# Patient Record
Sex: FEMALE | Race: WHITE | NOT HISPANIC OR LATINO | Employment: STUDENT | RURAL
[De-identification: names, ages, dates, MRNs, and addresses within clinical notes are randomized per-mention and may not be internally consistent; named-entity substitution may affect disease eponyms.]

---
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
## 2025-01-10 ENCOUNTER — OFFICE VISIT (OUTPATIENT)
Dept: FAMILY MEDICINE | Facility: CLINIC | Age: 20
End: 2025-01-10
Payer: COMMERCIAL

## 2025-01-10 VITALS
WEIGHT: 122 LBS | TEMPERATURE: 99 F | RESPIRATION RATE: 20 BRPM | HEART RATE: 78 BPM | HEIGHT: 64 IN | OXYGEN SATURATION: 100 % | SYSTOLIC BLOOD PRESSURE: 110 MMHG | BODY MASS INDEX: 20.83 KG/M2 | DIASTOLIC BLOOD PRESSURE: 70 MMHG

## 2025-01-10 DIAGNOSIS — R09.89 CHEST CONGESTION: ICD-10-CM

## 2025-01-10 DIAGNOSIS — J01.00 ACUTE NON-RECURRENT MAXILLARY SINUSITIS: Primary | ICD-10-CM

## 2025-01-10 DIAGNOSIS — R05.1 ACUTE COUGH: ICD-10-CM

## 2025-01-10 RX ORDER — GUAIFENESIN, PSEUDOEPHEDRINE HYDROCHLORIDE 600; 60 MG/1; MG/1
1 TABLET, EXTENDED RELEASE ORAL 2 TIMES DAILY
Qty: 20 TABLET | Refills: 0 | Status: SHIPPED | OUTPATIENT
Start: 2025-01-10 | End: 2025-01-20

## 2025-01-10 RX ORDER — DOXYCYCLINE HYCLATE 100 MG
100 TABLET ORAL 2 TIMES DAILY
Qty: 20 TABLET | Refills: 0 | Status: SHIPPED | OUTPATIENT
Start: 2025-01-10 | End: 2025-01-20

## 2025-01-10 NOTE — PROGRESS NOTES
Subjective:       Patient ID: Jhoana Waller is a 19 y.o. female.    Chief Complaint: Nasal Congestion and Cough (COUGH POST NOV. 2024.  CHEST KYAW POST 2 OR 3 WEEKS.  )    Cough, sinus congestion and pressure for 2-3 weeks    Review of Systems   Constitutional:  Negative for appetite change, fatigue and fever.   HENT:  Positive for nasal congestion and sinus pressure/congestion. Negative for ear pain and sore throat.    Eyes:  Negative for pain, discharge and itching.   Respiratory:  Positive for cough. Negative for shortness of breath.    Cardiovascular:  Negative for chest pain and leg swelling.   Gastrointestinal:  Negative for abdominal pain, change in bowel habit, nausea and vomiting.   Musculoskeletal:  Negative for back pain, gait problem and neck pain.   Integumentary:  Negative for rash and wound.   Allergic/Immunologic: Negative for immunocompromised state.   Neurological:  Negative for dizziness, weakness and headaches.   All other systems reviewed and are negative.        Objective:      Physical Exam  Vitals and nursing note reviewed.   Constitutional:       General: She is not in acute distress.     Appearance: Normal appearance. She is not ill-appearing, toxic-appearing or diaphoretic.   HENT:      Head: Normocephalic.      Right Ear: Tympanic membrane, ear canal and external ear normal.      Left Ear: Tympanic membrane, ear canal and external ear normal.      Nose: Mucosal edema and congestion present. No rhinorrhea.      Right Turbinates: Swollen.      Left Turbinates: Swollen.      Right Sinus: Maxillary sinus tenderness present.      Left Sinus: Maxillary sinus tenderness present.      Mouth/Throat:      Mouth: Mucous membranes are moist.      Pharynx: No oropharyngeal exudate or posterior oropharyngeal erythema.   Eyes:      General: No scleral icterus.        Right eye: No discharge.         Left eye: No discharge.      Extraocular Movements: Extraocular movements intact.       Conjunctiva/sclera: Conjunctivae normal.      Pupils: Pupils are equal, round, and reactive to light.   Cardiovascular:      Rate and Rhythm: Normal rate and regular rhythm.      Pulses: Normal pulses.      Heart sounds: Normal heart sounds. No murmur heard.  Pulmonary:      Effort: Pulmonary effort is normal. No respiratory distress.      Breath sounds: Normal breath sounds. No wheezing, rhonchi or rales.      Comments: Productive cough  Musculoskeletal:         General: Normal range of motion.      Cervical back: Neck supple. No tenderness.   Lymphadenopathy:      Cervical: No cervical adenopathy.   Skin:     General: Skin is warm and dry.      Capillary Refill: Capillary refill takes less than 2 seconds.      Findings: No rash.   Neurological:      Mental Status: She is alert and oriented to person, place, and time.   Psychiatric:         Mood and Affect: Mood normal.         Behavior: Behavior normal.         Thought Content: Thought content normal.         Judgment: Judgment normal.            Assessment:       1. Acute non-recurrent maxillary sinusitis    2. Acute cough    3. Chest congestion        Plan:   Acute non-recurrent maxillary sinusitis  -     pseudoephedrine-guaiFENesin  mg (MUCINEX D)  mg per tablet; Take 1 tablet by mouth 2 (two) times daily. for 10 days  Dispense: 20 tablet; Refill: 0  -     doxycycline (VIBRA-TABS) 100 MG tablet; Take 1 tablet (100 mg total) by mouth 2 (two) times daily. for 10 days  Dispense: 20 tablet; Refill: 0    Acute cough  -     pseudoephedrine-guaiFENesin  mg (MUCINEX D)  mg per tablet; Take 1 tablet by mouth 2 (two) times daily. for 10 days  Dispense: 20 tablet; Refill: 0    Chest congestion  -     pseudoephedrine-guaiFENesin  mg (MUCINEX D)  mg per tablet; Take 1 tablet by mouth 2 (two) times daily. for 10 days  Dispense: 20 tablet; Refill: 0           Risks, benefits, and side effects were discussed with the patient. All questions  were answered to the fullest satisfaction of the patient, and pt verbalized understanding and agreement to treatment plan. Pt was to call with any new or worsening symptoms, or present to the ER

## 2025-01-15 ENCOUNTER — OFFICE VISIT (OUTPATIENT)
Facility: CLINIC | Age: 20
End: 2025-01-15
Payer: COMMERCIAL

## 2025-01-15 VITALS
SYSTOLIC BLOOD PRESSURE: 137 MMHG | HEIGHT: 64 IN | OXYGEN SATURATION: 99 % | RESPIRATION RATE: 17 BRPM | WEIGHT: 120.81 LBS | DIASTOLIC BLOOD PRESSURE: 84 MMHG | BODY MASS INDEX: 20.63 KG/M2 | HEART RATE: 89 BPM

## 2025-01-15 DIAGNOSIS — Z72.51 HIGH RISK HETEROSEXUAL BEHAVIOR: ICD-10-CM

## 2025-01-15 DIAGNOSIS — N92.6 IRREGULAR MENSTRUAL CYCLE: Primary | ICD-10-CM

## 2025-01-15 DIAGNOSIS — Z11.3 ENCOUNTER FOR SCREENING FOR INFECTIONS WITH A PREDOMINANTLY SEXUAL MODE OF TRANSMISSION: ICD-10-CM

## 2025-01-15 DIAGNOSIS — G44.029 CHRONIC CLUSTER HEADACHE, NOT INTRACTABLE: ICD-10-CM

## 2025-01-15 LAB
B-HCG UR QL: NEGATIVE
CHLAMYDIA BY PCR: NEGATIVE
CTP QC/QA: YES
N. GONORRHOEAE (GC) BY PCR: NEGATIVE

## 2025-01-15 PROCEDURE — 3075F SYST BP GE 130 - 139MM HG: CPT | Mod: ,,, | Performed by: ADVANCED PRACTICE MIDWIFE

## 2025-01-15 PROCEDURE — 3079F DIAST BP 80-89 MM HG: CPT | Mod: ,,, | Performed by: ADVANCED PRACTICE MIDWIFE

## 2025-01-15 PROCEDURE — 87591 N.GONORRHOEAE DNA AMP PROB: CPT | Mod: ,,, | Performed by: CLINICAL MEDICAL LABORATORY

## 2025-01-15 PROCEDURE — 3008F BODY MASS INDEX DOCD: CPT | Mod: ,,, | Performed by: ADVANCED PRACTICE MIDWIFE

## 2025-01-15 PROCEDURE — 81025 URINE PREGNANCY TEST: CPT | Mod: QW,,, | Performed by: ADVANCED PRACTICE MIDWIFE

## 2025-01-15 PROCEDURE — 87491 CHLMYD TRACH DNA AMP PROBE: CPT | Mod: ,,, | Performed by: CLINICAL MEDICAL LABORATORY

## 2025-01-15 PROCEDURE — 1159F MED LIST DOCD IN RCRD: CPT | Mod: ,,, | Performed by: ADVANCED PRACTICE MIDWIFE

## 2025-01-15 PROCEDURE — 99214 OFFICE O/P EST MOD 30 MIN: CPT | Mod: ,,, | Performed by: ADVANCED PRACTICE MIDWIFE

## 2025-01-15 RX ORDER — NORETHINDRONE 0.35 MG/1
1 TABLET ORAL DAILY
Status: DISCONTINUED | OUTPATIENT
Start: 2025-01-15 | End: 2025-01-15

## 2025-01-15 RX ORDER — NORETHINDRONE 0.35 MG/1
1 TABLET ORAL DAILY
Qty: 30 TABLET | Refills: 11 | Status: SHIPPED | OUTPATIENT
Start: 2025-01-15 | End: 2026-01-15

## 2025-01-15 NOTE — PROGRESS NOTES
"Patient ID:  Jhoana Waller is a 19 y.o. female      Chief Complaint:   Chief Complaint   Patient presents with    Irregular cycles     Had cycle  -, then had another cycle - , and then -. States cycle are really heavy as well        HPI:  Jhoana had bleeding 3 different times in the past month.  She is not on any birth control but uses condoms.  She has tried patches and Dr. Bello gave her pills in the past but she didn't like them.  She normally has only one period a month.  I offered to start her on pills again if she will try it.  She has a headache several times a week and takes Excedrin for relief.  She denies any aura but does get a pain in neck near her spine.  She also has asthma.  LMP: Patient's last menstrual period was 2025 (exact date).   Sexually active:  yes  Contraceptive: condoms      Past Medical History:   Diagnosis Date    Mild intermittent asthma, uncomplicated      No past surgical history on file.    OB History          0    Para   0    Term   0       0    AB   0    Living   0         SAB   0    IAB   0    Ectopic   0    Multiple   0    Live Births   0                 /84 (BP Location: Left arm, Patient Position: Sitting)   Pulse 89   Resp 17   Ht 5' 4" (1.626 m)   Wt 54.8 kg (120 lb 12.8 oz)   LMP 2025 (Exact Date)   SpO2 99%   BMI 20.74 kg/m²   Wt Readings from Last 3 Encounters:   01/15/25 54.8 kg (120 lb 12.8 oz)   01/10/25 55.3 kg (122 lb)   24 54.4 kg (120 lb)          ROS:  Review of Systems   Constitutional: Negative.    HENT: Negative.     Respiratory: Negative.     Cardiovascular: Negative.    Gastrointestinal: Negative.    Genitourinary:  Positive for menstrual problem. Negative for dysuria, flank pain and pelvic pain.   Musculoskeletal:  Positive for back pain.   Neurological:  Positive for headaches.   Psychiatric/Behavioral: Negative.     Breast: negative.           PHYSICAL " EXAM:  Physical Exam  Vitals and nursing note reviewed.   Constitutional:       Appearance: Normal appearance.   HENT:      Head: Normocephalic.   Eyes:      Extraocular Movements: Extraocular movements intact.   Cardiovascular:      Rate and Rhythm: Normal rate.      Pulses: Normal pulses.   Pulmonary:      Effort: Pulmonary effort is normal.   Abdominal:      General: There is no distension.      Palpations: Abdomen is soft.      Tenderness: There is no abdominal tenderness. There is no guarding.   Musculoskeletal:         General: Normal range of motion.      Cervical back: Normal range of motion.   Skin:     General: Skin is warm and dry.   Neurological:      Mental Status: She is alert and oriented to person, place, and time.   Psychiatric:         Mood and Affect: Mood normal.         Behavior: Behavior normal.         Thought Content: Thought content normal.         Judgment: Judgment normal.          Assessment:  Jhoana was seen today for irregular cycles.    Diagnoses and all orders for this visit:    Irregular menstrual cycle  -     POCT urine pregnancy  -     Discontinue: norethindrone tablet 0.35 mg  -     norethindrone (MICRONOR) 0.35 mg tablet; Take 1 tablet (0.35 mg total) by mouth once daily.    Encounter for screening for infections with a predominantly sexual mode of transmission  -     Chlamydia/GC, PCR; Future  -     Chlamydia/GC, PCR    High risk heterosexual behavior  -     Chlamydia/GC, PCR; Future  -     Chlamydia/GC, PCR    Chronic cluster headache, not intractable          ICD-10-CM ICD-9-CM    1. Irregular menstrual cycle  N92.6 626.4 POCT urine pregnancy      norethindrone (MICRONOR) 0.35 mg tablet      DISCONTINUED: norethindrone tablet 0.35 mg      2. Encounter for screening for infections with a predominantly sexual mode of transmission  Z11.3 V74.5 Chlamydia/GC, PCR      Chlamydia/GC, PCR      3. High risk heterosexual behavior  Z72.51 V69.2 Chlamydia/GC, PCR      Chlamydia/GC, PCR       4. Chronic cluster headache, not intractable  G44.029 339.02           Plan:  Offered referral to neurology and she declines  Rx for Micronor, ACHES discussed  Call result of testing.  Continue using condoms      Follow up in about 8 weeks (around 3/12/2025).

## 2025-01-16 ENCOUNTER — PATIENT MESSAGE (OUTPATIENT)
Facility: CLINIC | Age: 20
End: 2025-01-16
Payer: COMMERCIAL

## 2025-03-12 ENCOUNTER — OFFICE VISIT (OUTPATIENT)
Facility: CLINIC | Age: 20
End: 2025-03-12
Payer: COMMERCIAL

## 2025-03-12 VITALS
RESPIRATION RATE: 18 BRPM | SYSTOLIC BLOOD PRESSURE: 125 MMHG | DIASTOLIC BLOOD PRESSURE: 75 MMHG | WEIGHT: 120 LBS | HEART RATE: 96 BPM | HEIGHT: 64 IN | TEMPERATURE: 99 F | BODY MASS INDEX: 20.49 KG/M2 | OXYGEN SATURATION: 98 %

## 2025-03-12 DIAGNOSIS — N92.6 IRREGULAR MENSTRUAL CYCLE: ICD-10-CM

## 2025-03-12 DIAGNOSIS — Z30.41 SURVEILLANCE FOR BIRTH CONTROL, ORAL CONTRACEPTIVES: Primary | ICD-10-CM

## 2025-03-12 PROCEDURE — 1159F MED LIST DOCD IN RCRD: CPT | Mod: ,,, | Performed by: ADVANCED PRACTICE MIDWIFE

## 2025-03-12 PROCEDURE — 99212 OFFICE O/P EST SF 10 MIN: CPT | Mod: ,,, | Performed by: ADVANCED PRACTICE MIDWIFE

## 2025-03-12 PROCEDURE — 3074F SYST BP LT 130 MM HG: CPT | Mod: ,,, | Performed by: ADVANCED PRACTICE MIDWIFE

## 2025-03-12 PROCEDURE — 3078F DIAST BP <80 MM HG: CPT | Mod: ,,, | Performed by: ADVANCED PRACTICE MIDWIFE

## 2025-03-12 PROCEDURE — 3008F BODY MASS INDEX DOCD: CPT | Mod: ,,, | Performed by: ADVANCED PRACTICE MIDWIFE

## 2025-03-12 NOTE — PROGRESS NOTES
"Patient ID:  Jhoana Waller is a 19 y.o. female      Chief Complaint:   Chief Complaint   Patient presents with    Follow-up     X2 month reports nothing new at this current time. Last seen for irregular cycles.  Taking Birth Control  Sexually Active  Declines STD Testing  Last Cycle -2025        HPI:  Jhoana was having more than one period per month at her January visit.  I started her on Micronor and she says she only has one period per month now and it is lighter.  Her headaches have decreased in frequency also  LMP: Patient's last menstrual period was 2025 (exact date).   Sexually active:  yes  Contraceptive: ocps      Past Medical History:   Diagnosis Date    Mild intermittent asthma, uncomplicated      No past surgical history on file.    OB History          0    Para   0    Term   0       0    AB   0    Living   0         SAB   0    IAB   0    Ectopic   0    Multiple   0    Live Births   0                 /75 (BP Location: Left arm, Patient Position: Sitting)   Pulse 96   Temp 98.6 °F (37 °C) (Oral)   Resp 18   Ht 5' 4" (1.626 m)   Wt 54.4 kg (120 lb)   LMP 2025 (Exact Date)   SpO2 98%   BMI 20.60 kg/m²   Wt Readings from Last 3 Encounters:   25 54.4 kg (120 lb)   01/15/25 54.8 kg (120 lb 12.8 oz)   01/10/25 55.3 kg (122 lb)          ROS:  Review of Systems   Constitutional: Negative.    HENT: Negative.     Respiratory: Negative.     Cardiovascular: Negative.    Genitourinary:  Negative for menstrual problem.   Musculoskeletal: Negative.    Neurological:  Positive for headaches.   Psychiatric/Behavioral: Negative.     Breast: negative.           PHYSICAL EXAM:  Physical Exam  Vitals and nursing note reviewed.   Constitutional:       Appearance: Normal appearance. She is normal weight.   HENT:      Head: Normocephalic.   Eyes:      Extraocular Movements: Extraocular movements intact.   Cardiovascular:      Rate and Rhythm: Normal rate.      Pulses: " Normal pulses.   Pulmonary:      Effort: Pulmonary effort is normal.   Musculoskeletal:         General: Normal range of motion.      Cervical back: Normal range of motion.   Skin:     General: Skin is warm and dry.   Neurological:      Mental Status: She is alert and oriented to person, place, and time.   Psychiatric:         Mood and Affect: Mood normal.         Behavior: Behavior normal.         Thought Content: Thought content normal.         Judgment: Judgment normal.          Assessment:  Jhoana was seen today for follow-up.    Diagnoses and all orders for this visit:    Surveillance for birth control, oral contraceptives    Irregular menstrual cycle          ICD-10-CM ICD-9-CM    1. Surveillance for birth control, oral contraceptives  Z30.41 V25.41       2. Irregular menstrual cycle  N92.6 626.4           Plan: Continue Micronor, ACHES discussed  Let me know if headaches worsen        Follow up in about 10 months (around 1/12/2026).

## 2025-06-02 ENCOUNTER — TELEPHONE (OUTPATIENT)
Dept: OBSTETRICS AND GYNECOLOGY | Facility: CLINIC | Age: 20
End: 2025-06-02
Payer: COMMERCIAL

## 2025-06-02 ENCOUNTER — PATIENT MESSAGE (OUTPATIENT)
Dept: OBSTETRICS AND GYNECOLOGY | Facility: CLINIC | Age: 20
End: 2025-06-02
Payer: COMMERCIAL

## 2025-07-28 ENCOUNTER — OFFICE VISIT (OUTPATIENT)
Dept: OBSTETRICS AND GYNECOLOGY | Facility: CLINIC | Age: 20
End: 2025-07-28
Payer: COMMERCIAL

## 2025-07-28 VITALS
BODY MASS INDEX: 20.43 KG/M2 | SYSTOLIC BLOOD PRESSURE: 128 MMHG | HEART RATE: 99 BPM | WEIGHT: 119 LBS | DIASTOLIC BLOOD PRESSURE: 82 MMHG

## 2025-07-28 DIAGNOSIS — N93.9 ABNORMAL UTERINE BLEEDING: Primary | ICD-10-CM

## 2025-07-28 PROCEDURE — 99213 OFFICE O/P EST LOW 20 MIN: CPT | Mod: S$PBB,,, | Performed by: STUDENT IN AN ORGANIZED HEALTH CARE EDUCATION/TRAINING PROGRAM

## 2025-07-28 PROCEDURE — 99999 PR PBB SHADOW E&M-EST. PATIENT-LVL III: CPT | Mod: PBBFAC,,, | Performed by: STUDENT IN AN ORGANIZED HEALTH CARE EDUCATION/TRAINING PROGRAM

## 2025-07-28 PROCEDURE — 1159F MED LIST DOCD IN RCRD: CPT | Mod: ,,, | Performed by: STUDENT IN AN ORGANIZED HEALTH CARE EDUCATION/TRAINING PROGRAM

## 2025-07-28 PROCEDURE — 3079F DIAST BP 80-89 MM HG: CPT | Mod: ,,, | Performed by: STUDENT IN AN ORGANIZED HEALTH CARE EDUCATION/TRAINING PROGRAM

## 2025-07-28 PROCEDURE — 99213 OFFICE O/P EST LOW 20 MIN: CPT | Mod: PBBFAC | Performed by: STUDENT IN AN ORGANIZED HEALTH CARE EDUCATION/TRAINING PROGRAM

## 2025-07-28 PROCEDURE — 3074F SYST BP LT 130 MM HG: CPT | Mod: ,,, | Performed by: STUDENT IN AN ORGANIZED HEALTH CARE EDUCATION/TRAINING PROGRAM

## 2025-07-28 PROCEDURE — 3008F BODY MASS INDEX DOCD: CPT | Mod: ,,, | Performed by: STUDENT IN AN ORGANIZED HEALTH CARE EDUCATION/TRAINING PROGRAM

## 2025-07-28 RX ORDER — NORETHINDRONE ACETATE AND ETHINYL ESTRADIOL 1.5-30(21)
1 KIT ORAL DAILY
Qty: 90 TABLET | Refills: 3 | Status: SHIPPED | OUTPATIENT
Start: 2025-07-28 | End: 2026-07-28

## 2025-07-28 NOTE — PROGRESS NOTES
Gynecology History and Physical    Assessment/Plan:   Problem List Items Addressed This Visit    None  Visit Diagnoses         Abnormal uterine bleeding    -  Primary              CC:   Chief Complaint   Patient presents with    Establish Care     Pt is a new pt and c/o constant bleeding, pt states cycle has been constant since April. Some days are light, some are heavy and dark. Pt is currently on a bc and wants to discuss another brand  type of bc if possible.     HPI: Jhoana Waller is a 20 y.o. female who presents with complaints of irregular bleeding. States she has tried a few different birth control pills with little success. Unable to remember what OCP she tried with Dr. Bello.      Review of Systems: The following ROS was otherwise negative, except as noted in the HPI:  constitutional, HEENT, respiratory, cardiovascular, gastrointestinal, genitourinary, skin, musculoskeletal, neurological, psych    Gynecologic History:   denies history of of STIs  Menstrual history:       Obstetrical History:  OB History          0    Para   0    Term   0       0    AB   0    Living   0         SAB   0    IAB   0    Ectopic   0    Multiple   0    Live Births   0                 Past Medical History:   Past Medical History:   Diagnosis Date    Anxiety 3 years    Clotting disorder 1 year ago    Mild intermittent asthma, uncomplicated        Medications:  Medication List with Changes/Refills   New Medications    NORETHINDRONE-ETHINYL ESTRADIOL-IRON (JUNEL FE 1.5/30, 28,) 1.5 MG-30 MCG (21)/75 MG (7) TABLET    Take 1 tablet by mouth once daily.   Current Medications    ALBUTEROL (PROVENTIL) 5 MG/ML NEBULIZER SOLUTION    Take 2.5 mg by nebulization every 6 (six) hours as needed for Wheezing. Rescue    CETIRIZINE (ZYRTEC) 10 MG TABLET    Take 10 mg by mouth once daily.    MOMETASONE 0.1% (ELOCON) 0.1 % CREAM    SMARTSIG:sparingly Topical Daily    WIXELA INHUB 250-50 MCG/DOSE DISKUS INHALER    PLEASE SEE ATTACHED  FOR DETAILED DIRECTIONS   Discontinued Medications    NORETHINDRONE (MICRONOR) 0.35 MG TABLET    Take 1 tablet (0.35 mg total) by mouth once daily.       Allergies:  Omnicef [cefdinir]    Surgical History:  History reviewed. No pertinent surgical history.    Family History:  Family History   Problem Relation Name Age of Onset    Thyroid disease Mother Janelle Waller     Asthma Father Ulises Waller     Cancer Maternal Grandfather Logan Morse     Seizures Maternal Grandfather Logan Morse     Stroke Maternal Grandfather Logan Morse     Diabetes Maternal Grandmother Jung Morse        Social History:  Social History     Substance and Sexual Activity   Alcohol Use Never     Social History     Substance and Sexual Activity   Drug Use Never     Tobacco Use History[1]    Physical Exam:  /82   Pulse 99   Wt 54 kg (119 lb)   LMP 07/13/2025   BMI 20.43 kg/m²     General: Alert, well appearing, no acute distress  Head: Normocephalic, atraumatic  Lungs: Unlabored respirations  Abdomen: Soft, nontender, nondistended   Pelvic:  deferred  Extremities: No redness or tenderness  Skin: Well perfused, normal coloration and turgor, no lesions or rashes visualized  Neuro: Alert, oriented, normal speech, no focal deficits, moves extremities appropriately  Osteopathic: No TART changes    Labs:  No visits with results within 1 Day(s) from this visit.   Latest known visit with results is:   Office Visit on 01/15/2025   Component Date Value    POC Preg Test, Ur 01/15/2025 Negative      Acceptab* 01/15/2025 Yes     Chlamydia by PCR 01/15/2025 Negative     N. gonorrhoeae (GC) by P* 01/15/2025 Negative       Will try course of Junel, rx sent to pharmacy  Return in 3 mo       [1]   Social History  Tobacco Use   Smoking Status Never    Passive exposure: Never   Smokeless Tobacco Never